# Patient Record
Sex: FEMALE | Race: WHITE | NOT HISPANIC OR LATINO | URBAN - NONMETROPOLITAN AREA
[De-identification: names, ages, dates, MRNs, and addresses within clinical notes are randomized per-mention and may not be internally consistent; named-entity substitution may affect disease eponyms.]

---

## 2023-03-15 ENCOUNTER — OFFICE (OUTPATIENT)
Dept: URBAN - NONMETROPOLITAN AREA CLINIC 6 | Facility: CLINIC | Age: 73
End: 2023-03-15
Payer: MEDICAID

## 2023-03-15 VITALS
HEART RATE: 95 BPM | DIASTOLIC BLOOD PRESSURE: 72 MMHG | SYSTOLIC BLOOD PRESSURE: 129 MMHG | RESPIRATION RATE: 18 BRPM | HEIGHT: 61 IN | WEIGHT: 136 LBS

## 2023-03-15 DIAGNOSIS — R10.32 LEFT LOWER QUADRANT PAIN: ICD-10-CM

## 2023-03-15 DIAGNOSIS — K21.9 GASTRO-ESOPHAGEAL REFLUX DISEASE WITHOUT ESOPHAGITIS: ICD-10-CM

## 2023-03-15 DIAGNOSIS — R19.4 CHANGE IN BOWEL HABIT: ICD-10-CM

## 2023-03-15 PROCEDURE — 99204 OFFICE O/P NEW MOD 45 MIN: CPT | Performed by: INTERNAL MEDICINE

## 2023-03-15 NOTE — SERVICENOTES
-  she is to begin two Metamucil capsules every evening with her dinner meal.  Hopefully that will stabilize things somewhat between being irregular and somewhat loose with her stools
-  I think some of the incontinence is coming from partly being rushed on most mornings she is going for her mental health appointments and also from some of the anxiety that may stem in the change in routine that goes on on those days.  I have written an order for them to give her a Levsin tablet, 0.125 mg, on Tuesdays and Thursdays in the morning to try and help block any urgency and incontinence that may occur in route to those appointments
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
-  will have her follow back up with my nurse practitioner in two months for recheck

## 2023-03-15 NOTE — SERVICEHPINOTES
Celia Scott) is a   71 yo  female   whom I am seeing as a new patient today  due to worsening issues with bowel irregularity, diarrhea, and with episodes of fecal incontinence.  Extensive records have been received from the [facility],  where she is currently a resident.  Bowel movements are somewhat variable in that she may go daily a timer to in then miss a day or two.  Stools may be normal to somewhat constipated to urgent and loose.  The majority of her bowels with incontinence tend to occur on Tuesdays and Thursdays when she rides in the transport van with other residents to the medical clinic in [place] for mental health evaluation and checkup.  She tends to be rushed that morning so does not have time to have normal leisurely bowel movement after breakfast.  It may happen going to the appointment or on the way back and she is in protective undergarments just in case.  On occasion it may happen at home.  She has diabetes for a number of years.  Apparently she has been having some gallbladder attacks with known sludge and stones and is set to have her gallbladder out in the near future.  She takes Protonix daily and MiraLax only on those occasions she goes more than two days without a bowel movement.  Her last colonoscopy was in 2017.  Basic blood work has been reviewed with normal hemogram and electrolytes.